# Patient Record
Sex: FEMALE | Race: WHITE | Employment: UNEMPLOYED | URBAN - METROPOLITAN AREA
[De-identification: names, ages, dates, MRNs, and addresses within clinical notes are randomized per-mention and may not be internally consistent; named-entity substitution may affect disease eponyms.]

---

## 2023-07-20 ENCOUNTER — TELEPHONE (OUTPATIENT)
Dept: GASTROENTEROLOGY | Facility: CLINIC | Age: 58
End: 2023-07-20
Payer: COMMERCIAL

## 2023-07-20 NOTE — TELEPHONE ENCOUNTER
----- Message from Anabelle Lebron MA sent at 7/18/2023  3:00 PM CDT -----  Regarding: referral  Contact: self  Have you received any referrals on this patient?   ----- Message -----  From: Tsering Pierre  Sent: 7/18/2023   2:55 PM CDT  To: [facility] Staff    Type: Staff Message    Caller: Genny Matos  Call Back Number: [phone]    Nature of the Call: Inquiring about a referral from Dr.Andres Gracemo [phone]. Pt is wanting to schedule an pt. Please advise    Additional Information: na